# Patient Record
Sex: MALE | Race: BLACK OR AFRICAN AMERICAN | NOT HISPANIC OR LATINO | URBAN - METROPOLITAN AREA
[De-identification: names, ages, dates, MRNs, and addresses within clinical notes are randomized per-mention and may not be internally consistent; named-entity substitution may affect disease eponyms.]

---

## 2022-05-30 ENCOUNTER — EMERGENCY (EMERGENCY)
Facility: HOSPITAL | Age: 27
LOS: 1 days | Discharge: HOME | End: 2022-05-30
Attending: EMERGENCY MEDICINE
Payer: COMMERCIAL

## 2022-05-30 VITALS
WEIGHT: 315 LBS | OXYGEN SATURATION: 98 % | SYSTOLIC BLOOD PRESSURE: 137 MMHG | HEART RATE: 56 BPM | HEIGHT: 71 IN | TEMPERATURE: 98 F | RESPIRATION RATE: 18 BRPM | DIASTOLIC BLOOD PRESSURE: 86 MMHG

## 2022-05-30 PROCEDURE — 99283 EMERGENCY DEPT VISIT LOW MDM: CPT | Mod: 25

## 2022-05-30 PROCEDURE — 69209 REMOVE IMPACTED EAR WAX UNI: CPT

## 2022-05-30 PROCEDURE — 69210 REMOVE IMPACTED EAR WAX UNI: CPT | Mod: LT

## 2022-05-30 PROCEDURE — 99282 EMERGENCY DEPT VISIT SF MDM: CPT

## 2022-05-30 NOTE — ED PROVIDER NOTE - NSICDXFAMILYHX_GEN_ALL_CORE_FT
[Subsequent Evaluation] : a subsequent evaluation for [FreeTextEntry2] : f/u [facility]BR & VNG FAMILY HISTORY:  No pertinent family history in first degree relatives

## 2022-05-30 NOTE — ED ADULT TRIAGE NOTE - CHIEF COMPLAINT QUOTE
pt c/o pain to left ear x 2 days  A&Ox3, resp wnl, holding ear pt c/o pain to left ear x 2 days  A&Ox3, resp wnl, holding ear, + brown drainage noted

## 2022-05-30 NOTE — ED PROVIDER NOTE - ENMT, MLM
Airway patent, Nasal mucosa clear. Mouth with normal mucosa. Throat has no vesicles, no oropharyngeal exudates and uvula is midline. Left EAC mild swelling and erythema, + cerumen

## 2022-05-30 NOTE — ED PROVIDER NOTE - PATIENT PORTAL LINK FT
You can access the FollowMyHealth Patient Portal offered by [facility] by registering at the following website: [email or web address]. By joining [facility]’s FollowMyHealth portal, you will also be able to view your health information using other applications (apps) compatible with our system.

## 2022-05-30 NOTE — ED PROVIDER NOTE - CLINICAL SUMMARY MEDICAL DECISION MAKING FREE TEXT BOX
cerumen removed; improved symptoms; PMD or clinic follow up recommended for reassessment. Patient is aware of signs/symptoms to return to the emergency department.

## 2024-09-08 ENCOUNTER — EMERGENCY (EMERGENCY)
Facility: HOSPITAL | Age: 29
LOS: 1 days | Discharge: HOME | End: 2024-09-08
Attending: EMERGENCY MEDICINE
Payer: COMMERCIAL

## 2024-09-08 VITALS
DIASTOLIC BLOOD PRESSURE: 61 MMHG | RESPIRATION RATE: 18 BRPM | TEMPERATURE: 98 F | OXYGEN SATURATION: 96 % | HEART RATE: 72 BPM | SYSTOLIC BLOOD PRESSURE: 113 MMHG | WEIGHT: 270.07 LBS

## 2024-09-08 PROCEDURE — 93010 ELECTROCARDIOGRAM REPORT: CPT

## 2024-09-08 PROCEDURE — 93005 ELECTROCARDIOGRAM TRACING: CPT

## 2024-09-08 PROCEDURE — 99283 EMERGENCY DEPT VISIT LOW MDM: CPT | Mod: 25

## 2024-09-08 PROCEDURE — 99284 EMERGENCY DEPT VISIT MOD MDM: CPT

## 2024-09-08 NOTE — ED PROVIDER NOTE - NSFOLLOWUPINSTRUCTIONS_ED_ALL_ED_FT
Patient Name: VIDHYA STEWART  Caregiver: Beka Perales NIKUNJ  Chemical Inhalation Injury, Adult       A chemical inhalation injury happens when a person breathes in (inhales) fumes or particles from chemicals that damage the throat, airways, or lungs (respiratory tract). Chemical inhalation injuries most often occur:    During fires, when materials that are burned release chemicals into the environment.  During work accidents, when large amounts of toxic chemicals are spilled at factories or industrial sites.    Chemicals that may be harmful are also found in many common household  and other products, such as:    Aerosol sprays.  Oven .  Air fresheners.  Furniture polish.    Mixing ammonia (or a product that contains ammonia) with bleach (or a product that contains bleach) is another common cause of household chemical inhalation injuries. This mixture produces a harmful gas (chloramine) that can irritate the lungs.    Chemical inhalation injuries can range from mild irritation of the upper airway to a serious injury deep in the lungs. This can affect your breathing ability. The level of injury depends on what chemical was involved, how strong it is, and how long you were exposed to it.    What are the causes?  Chemical inhalation injuries are caused by inhaling fumes or particles from chemicals. This can happen at work, at home, or during a fire.    What increases the risk?  This injury is more likely to happen to people who:    Are exposed to burning materials.  Work with chemicals, solvents, or .    What are the signs or symptoms?  Symptoms of this injury may include:    Burning or irritation of the eyes, nose, throat, and windpipe (trachea).  Coughing.  Shortness of breath.  Wheezing.  Trouble breathing. This is caused by airway swelling.  Coughing up blood.  Headache.  Dizziness.  Nausea or vomiting.  Weakness or fatigue.    Depending on the type of chemical exposure, you may have symptoms right away or up to 12 hours later.    How is this diagnosed?  This injury is diagnosed based on your symptoms, your medical history, and a physical exam. You may also have tests, including:    Breathing tests to measure how much air your lungs can hold.  Tests to measure the level of oxygen in your blood (pulse oximetry).  Imaging studies, such as:    Chest X-rays. These check for fluid buildup or inflammation of the small airways of the lungs (bronchioles).  Chest CT scan. This test can identify injuries that are not visible by X-ray.    How is this treated?  Oxygen therapy is the main treatment for this injury. You may be given extra oxygen through a mask or a nose tube. Severe injuries may require use of a machine (ventilator) to help you breathe. Treatment may also include:    Medicine to open the airways and reduce inflammation (bronchodilators or corticosteroids).  IV fluids.  Antibiotic medicines to prevent or treat lung infection caused by bacteria.    Depending on the injury, you may need to stay in the hospital to be monitored closely.    Follow these instructions at home:  Take over-the-counter and prescription medicines only as told by your health care provider.  If you were prescribed an antibiotic medicine, take it as told by your health care provider. Do not stop taking the antibiotic even if you start to feel better.  Return to your normal activities as told by your health care provider. Ask your health care provider what activities are safe for you.  Do not use any products that contain nicotine or tobacco, such as cigarettes and e-cigarettes. If you need help quitting, ask your health care provider.  Keep all follow-up visits as told by your health care provider. This is important.    How is this prevented?  To prevent this type of injury:    Do not mix bleach or any bleach-containing product with any  that contains ammonia.  Do not put potentially harmful chemicals in spray bottles.  Read all labels and follow instructions for using cleaning products.  Use cleaning products only in well-ventilated areas.  Follow work safety practices closely if your job puts you at risk for chemical inhalation. Be very careful to limit your exposure to gases and chemicals.    Contact a health care provider if:  You have a fever.  Your symptoms do not improve or they get worse.    Get help right away if:  You have trouble breathing.  You are coughing up blood.    Summary  A chemical inhalation injury happens when a person breathes in (inhales) fumes or particles from chemicals that damage the throat, airways, or lungs (respiratory tract).  Chemical inhalation injuries occur most often during fires or work accidents. Chemicals that may be harmful are also found in many common household  and other products.  Oxygen therapy is the main treatment for this injury. You may be given extra oxygen through a mask or a nose tube.  If your job puts you at risk for chemical inhalation, follow work safety practices closely. Be very careful to limit your exposure to gases and chemicals.    ADDITIONAL NOTES AND INSTRUCTIONS    Please follow up with your Primary MD in 24-48 hr.  Seek immediate medical care for any new/worsening signs or symptoms.     Document Released: 8/21/2015 Document Revised: 11/30/2018 Document Reviewed: 3/9/2018  [employer] Patient Education ©2019 [employer] This information is not intended to replace advice given to you by your health care provider. Make sure you discuss any questions you have with your health care provider.

## 2024-09-08 NOTE — ED ADULT NURSE NOTE - EXTENSIONS OF SELF_ADULT
None Advance activity as tolerated.  Continue all previously prescribed medications as directed unless otherwise instructed.  Take Tylenol 650mg (Two 325 mg pills) every 4-6 hours as needed for pain or fevers. Take Motrin (also sold as Advil or Ibuprofen) 400-600 mg (two or three 200 mg over the counter pills) every 8 hours as needed for moderate pain or fevers-- take with food.  Follow up with your primary care physician and ob/gyn (referral list provided) in 48-72 hours- bring copies of your results.  Return to the ER for worsening or persistent symptoms, including but not limited to worsening/persistent pain, fevers, vomiting, lightheadedness, passing out and/or ANY NEW OR CONCERNING SYMPTOMS. If you have issues obtaining follow up, please call: [phone] to obtain a doctor or specialist who takes your insurance in your area.  You may call [phone] to make an appointment with the internal medicine clinic.

## 2024-09-08 NOTE — ED ADULT TRIAGE NOTE - CHIEF COMPLAINT QUOTE
BIBEMS as per pt was at work mixed bleach and fabuloso and started having chest pain, SOB and dizziness. Denies med hx

## 2024-09-08 NOTE — ED PROVIDER NOTE - CLINICAL SUMMARY MEDICAL DECISION MAKING FREE TEXT BOX
lungs clear no pulmonary disease in no acute distress stable for discharge home return to ED if worse

## 2024-09-08 NOTE — ED PROVIDER NOTE - PATIENT PORTAL LINK FT
You can access the FollowMyHealth Patient Portal offered by [facility] by registering at the following website: [email or web address]. By joining [employer]’s FollowMyHealth portal, you will also be able to view your health information using other applications (apps) compatible with our system.

## 2024-09-08 NOTE — ED ADULT NURSE NOTE - OBJECTIVE STATEMENT
pt. is aaox4. states he experienced chest pain earlier but now feels better. no acute resp distress noted. pt. wants to go home. md to d/c. safety maintained.

## 2024-09-08 NOTE — ED PROVIDER NOTE - OBJECTIVE STATEMENT
Patient presents to ED after inhalation injury at work no drooling no dysphagia states he got scared no chest pain or shortness of breath on my evaluation no headache no loss conscious no abdominal pain no nausea vomiting diarrhea

## 2024-09-09 PROBLEM — Z78.9 OTHER SPECIFIED HEALTH STATUS: Chronic | Status: ACTIVE | Noted: 2022-05-30
